# Patient Record
Sex: MALE | Race: WHITE | NOT HISPANIC OR LATINO | Employment: FULL TIME | URBAN - METROPOLITAN AREA
[De-identification: names, ages, dates, MRNs, and addresses within clinical notes are randomized per-mention and may not be internally consistent; named-entity substitution may affect disease eponyms.]

---

## 2018-10-05 ENCOUNTER — OFFICE VISIT (OUTPATIENT)
Dept: INTERNAL MEDICINE CLINIC | Facility: CLINIC | Age: 48
End: 2018-10-05
Payer: COMMERCIAL

## 2018-10-05 ENCOUNTER — HOSPITAL ENCOUNTER (OUTPATIENT)
Dept: RADIOLOGY | Facility: HOSPITAL | Age: 48
Discharge: HOME | End: 2018-10-05
Payer: COMMERCIAL

## 2018-10-05 VITALS
TEMPERATURE: 98 F | HEIGHT: 68 IN | OXYGEN SATURATION: 99 % | SYSTOLIC BLOOD PRESSURE: 112 MMHG | RESPIRATION RATE: 18 BRPM | HEART RATE: 71 BPM | WEIGHT: 217.6 LBS | DIASTOLIC BLOOD PRESSURE: 82 MMHG | BODY MASS INDEX: 32.98 KG/M2

## 2018-10-05 DIAGNOSIS — G47.33 OSA (OBSTRUCTIVE SLEEP APNEA): ICD-10-CM

## 2018-10-05 DIAGNOSIS — G89.29 CHRONIC PAIN OF BOTH KNEES: ICD-10-CM

## 2018-10-05 DIAGNOSIS — M54.42 CHRONIC BILATERAL LOW BACK PAIN WITH LEFT-SIDED SCIATICA: ICD-10-CM

## 2018-10-05 DIAGNOSIS — M25.561 CHRONIC PAIN OF BOTH KNEES: ICD-10-CM

## 2018-10-05 DIAGNOSIS — F43.22 PERSISTENT ADJUSTMENT DISORDER WITH ANXIETY: ICD-10-CM

## 2018-10-05 DIAGNOSIS — M25.562 CHRONIC PAIN OF BOTH KNEES: ICD-10-CM

## 2018-10-05 DIAGNOSIS — G89.29 CHRONIC BILATERAL LOW BACK PAIN WITH LEFT-SIDED SCIATICA: ICD-10-CM

## 2018-10-05 DIAGNOSIS — R42 DIZZINESS: Primary | ICD-10-CM

## 2018-10-05 DIAGNOSIS — K21.9 GASTROESOPHAGEAL REFLUX DISEASE, ESOPHAGITIS PRESENCE NOT SPECIFIED: ICD-10-CM

## 2018-10-05 DIAGNOSIS — Z13.220 SCREENING, LIPID: ICD-10-CM

## 2018-10-05 DIAGNOSIS — E66.09 CLASS 1 OBESITY DUE TO EXCESS CALORIES WITHOUT SERIOUS COMORBIDITY WITH BODY MASS INDEX (BMI) OF 33.0 TO 33.9 IN ADULT: ICD-10-CM

## 2018-10-05 PROBLEM — E73.9 LACTOSE INTOLERANCE: Status: ACTIVE | Noted: 2018-10-05

## 2018-10-05 PROCEDURE — 73562 X-RAY EXAM OF KNEE 3: CPT

## 2018-10-05 PROCEDURE — 99204 OFFICE O/P NEW MOD 45 MIN: CPT | Performed by: INTERNAL MEDICINE

## 2018-10-05 PROCEDURE — 72110 X-RAY EXAM L-2 SPINE 4/>VWS: CPT

## 2018-10-05 PROCEDURE — 1036F TOBACCO NON-USER: CPT | Performed by: INTERNAL MEDICINE

## 2018-10-05 RX ORDER — RANITIDINE 150 MG/1
150 TABLET ORAL
COMMUNITY
Start: 2018-03-01 | End: 2018-10-05 | Stop reason: SDUPTHER

## 2018-10-05 RX ORDER — TRAZODONE HYDROCHLORIDE 50 MG/1
100 TABLET ORAL
Qty: 90 TABLET | Refills: 0 | Status: SHIPPED | OUTPATIENT
Start: 2018-10-05

## 2018-10-05 RX ORDER — RANITIDINE 150 MG/1
150 TABLET ORAL
Qty: 90 TABLET | Refills: 0 | Status: SHIPPED | OUTPATIENT
Start: 2018-10-05

## 2018-10-05 RX ORDER — TRAZODONE HYDROCHLORIDE 50 MG/1
100 TABLET ORAL
COMMUNITY
Start: 2018-04-17 | End: 2018-10-05 | Stop reason: SDUPTHER

## 2018-10-05 NOTE — PATIENT INSTRUCTIONS
Cut back on iced tea  Limit fluid 2 hours before bed time  Start aerobic exercise, 30 to 40 minutes at least 3 times a week

## 2018-10-05 NOTE — PROGRESS NOTES
Assessment/Plan:    ERI (obstructive sleep apnea)  Using CPAP qHS  Chronic bilateral low back pain with left-sided sciatica  Frequent sciatica symptoms, check x-ray  May benefit with physical therapy  Chronic pain of both knees  Suspect arthritis, check imaging  GERD (gastroesophageal reflux disease)  Takes ranitidine qHS  Class 1 obesity due to excess calories without serious comorbidity with body mass index (BMI) of 33 0 to 33 9 in adult  Discussed diet and importance of regular exercise  Persistent adjustment disorder with anxiety  Takes trazodone qHS  Bullous emphysema (HCC)  Consider PFTs  Diagnoses and all orders for this visit:    Dizziness  Comments:  Intermittent symptoms, not related to position changes  If normal lab work, consider cardiac evaluation  Orders:  -     CBC and differential  -     Comprehensive metabolic panel  -     TSH, 3rd generation with Free T4 reflex    Class 1 obesity due to excess calories without serious comorbidity with body mass index (BMI) of 33 0 to 33 9 in adult    Chronic pain of both knees  -     XR knee 3 vw right non injury; Future  -     XR knee 3 vw left non injury; Future  -     Uric acid    Chronic bilateral low back pain with left-sided sciatica  -     XR spine lumbar minimum 4 views non injury; Future    Persistent adjustment disorder with anxiety  -     TSH, 3rd generation with Free T4 reflex  -     traZODone (DESYREL) 50 mg tablet; Take 2 tablets (100 mg total) by mouth daily at bedtime    Screening, lipid  -     Lipid panel    Gastroesophageal reflux disease, esophagitis presence not specified  -     ranitidine (ZANTAC) 150 mg tablet; Take 1 tablet (150 mg total) by mouth daily at bedtime    ERI (obstructive sleep apnea)    Other orders  -     Discontinue: traZODone (DESYREL) 50 mg tablet; Take 100 mg by mouth daily at bedtime  -     Discontinue: ranitidine (ZANTAC) 150 mg tablet;  Take 150 mg by mouth daily at bedtime      Follow up in 2 months or as needed  Subjective:      Patient ID: Rebekah Dobson is a 41 y o  male  Mr Jessi Valentin is here with his wife  First, he complains of occasional dizzy spells  This would occur usually in the morning  He is not sure if he is dehydrated or if he did not eat  He feels he is about to pass out, no syncopal events  He feels ears are full, no chest pain or heaviness  He eats something and symptoms would improve  He noticed symptoms are according more frequently  Second, he feels he is unable to take a deep breath in  He stopped smoking about 2 years ago  He denies any cough, wheezing, shortness of breath or other symptoms  Third, he reported some bloating when eating ice cream and the report ox  He started using Lactaid with good results  He takes ranitidine every night for his reflux symptoms  Fourth, he complains of arthritis in his neck back and hands  He reports he has chronic low back pain, with frequent radiation down his left leg  This is ongoing for 10 years, had a workup in the past he was told he had arthritis  He also reports that he had a lump at the side of his left thigh  Ultrasound was normal   This lump eventually resolved  The following portions of the patient's history were reviewed and updated as appropriate: allergies, current medications, past family history, past medical history, past social history, past surgical history and problem list     Review of Systems   Constitutional: Negative for appetite change and fatigue  HENT: Negative for congestion, hearing loss and postnasal drip  Eyes: Negative  Respiratory: Negative for cough, chest tightness and shortness of breath  Cardiovascular: Negative for chest pain, palpitations and leg swelling  Gastrointestinal: Negative for abdominal pain and constipation  Genitourinary: Negative for dysuria, frequency and urgency     Musculoskeletal: Positive for arthralgias, back pain, neck pain and neck stiffness  Skin: Negative for rash and wound  Neurological: Positive for dizziness and light-headedness  Negative for numbness and headaches  Hematological: Negative for adenopathy  Does not bruise/bleed easily  Psychiatric/Behavioral: Negative for sleep disturbance  The patient is not nervous/anxious  Objective:      /82   Pulse 71   Temp 98 °F (36 7 °C)   Resp 18   Ht 5' 7 5" (1 715 m)   Wt 98 7 kg (217 lb 9 6 oz)   SpO2 99%   BMI 33 58 kg/m²          Physical Exam   Constitutional: He is oriented to person, place, and time  He appears well-developed and well-nourished  HENT:   Head: Normocephalic and atraumatic  Right Ear: Tympanic membrane and external ear normal    Left Ear: Tympanic membrane and external ear normal    Nose: Nose normal    Mouth/Throat: Uvula is midline, oropharynx is clear and moist and mucous membranes are normal    Eyes: Pupils are equal, round, and reactive to light  Conjunctivae are normal    Neck: Neck supple  No thyroid mass present  Cardiovascular: Normal rate, regular rhythm and normal heart sounds  No edema  Pulmonary/Chest: Effort normal and breath sounds normal  He has no wheezes  He has no rhonchi  Abdominal: Soft  Bowel sounds are normal  There is no tenderness  No hernia  Musculoskeletal: Normal range of motion  Right knee: He exhibits normal range of motion and no swelling  No tenderness found  Left knee: He exhibits normal range of motion and no swelling  No tenderness found  Lumbar back: He exhibits spasm  He exhibits normal range of motion, no tenderness and no pain  No joint pain or swelling   Lymphadenopathy:     He has no cervical adenopathy  Right: No supraclavicular adenopathy present  Left: No supraclavicular adenopathy present  Neurological: He is alert and oriented to person, place, and time  No cranial nerve deficit  Skin: Skin is warm  No rash noted     Psychiatric: He has a normal mood and affect  His behavior is normal    Nursing note and vitals reviewed

## 2018-10-18 ENCOUNTER — TELEPHONE (OUTPATIENT)
Dept: INTERNAL MEDICINE CLINIC | Facility: CLINIC | Age: 48
End: 2018-10-18

## 2018-10-18 NOTE — TELEPHONE ENCOUNTER
Back and knee x-rays are normal, no arthritis  Recommend physical therapy  Don't forget to do your blood work